# Patient Record
(demographics unavailable — no encounter records)

---

## 2025-03-05 NOTE — HISTORY OF PRESENT ILLNESS
[de-identified] : 40 year old M patient presents today with left shoulder/back pain.  Of note patient has medical history of cerebral palsy. Patient does not recall any injury or trauma to this arm.  Most of his pain is towards the back around his scapula mostly. He endorses pain with certain movements of his shoulder.  He has had prior injections to this area completed by outside providers which helped relieve his pain.  He denies any numbness or tingling. The patient's past medical history, past surgical history, medications, allergies, and social history were reviewed by me today with the patient and documented accordingly. In addition, the patient's family history, which is noncontributory to this visit, was also reviewed.

## 2025-03-05 NOTE — PHYSICAL EXAM
[de-identified] : General Exam   Well developed, well nourished No apparent distress Oriented to person, place, and time Mood: Normal Affect: Normal Balance and coordination: Normal Gait: Normal  Left shoulder exam   Inspection: No swelling, ecchymosis or gross deformity. Skin: No masses, No lesions Tenderness: No bicipital tenderness, no tenderness to the greater tuberosity/RTC insertion, no anterior shoulder/lesser tuberosity tenderness. No tenderness SC joint, clavicle, AC joint.  There is tenderness along the medial scapular border ROM: 160/60/T6 Impingement tests: Positive Crocker AC Joint: no pain with cross arm testing Biceps: Negative speed Strength: 5/5 abduction, external rotation, and internal rotation Neuro: AIN, PIN, Ulnar nerve motor intact Sensation: Intact to light touch in radial, median, ulnar, and axillary nerve distributions Vasc: 2+ radial pulse [de-identified] : The following radiographs were ordered and read by me during this patients visit. I reviewed each radiograph in detail with the patient and discussed the findings as highlighted below.  3 views left shoulder were obtained today glenohumeral joint well-maintained normal alignment no fracture.

## 2025-03-05 NOTE — DISCUSSION/SUMMARY
[de-identified] : Longstanding left scapulothoracic pain he said pain off and on for almost 15 years he has had at least 3 corticosteroid injections into this area they have helped but have been self-limiting.  He has pain with overhead activity and reaching behind his back he is significant tenderness along his medial scapular border.  We will obtain an MRI to assess for anatomic causes of his scapulothoracic pain and bursitis.  Consider potential EMG if MRI is unremarkable all questions answered

## 2025-06-13 NOTE — PHYSICAL EXAM
[de-identified] : Right shoulder exam   Inspection: No swelling, ecchymosis or gross deformity. Skin: No masses, No lesions Tenderness: No bicipital tenderness, no tenderness to the greater tuberosity/RTC insertion, no anterior shoulder/lesser tuberosity tenderness. No tenderness SC joint, clavicle, AC joint. ROM: 160/60/T6 Impingement tests: Positive Crocker AC Joint: no pain with cross arm testing Biceps: Negative speed Strength: 5/5 abduction, external rotation, and 4 out of 5 internal rotation with positive belly press and lift off Neuro: AIN, PIN, Ulnar nerve motor intact Sensation: Intact to light touch in radial, median, ulnar, and axillary nerve distributions Vasc: 2+ radial pulse  [de-identified] : The following radiographs were ordered and read by me during this patients visit. I reviewed each radiograph in detail with the patient and discussed the findings as highlighted below.  3 views right shoulder were obtained today glenohumeral joint well-maintained normal alignment no fracture.

## 2025-06-13 NOTE — HISTORY OF PRESENT ILLNESS
[de-identified] : 40 year old M patient presents today with new issue of right shoulder pain.  Of note patient has medical history of cerebral palsy. While playing cricket over the weekend, when he attempted to throw the ball with his right arm he felt a pop and an immediate sharp pain. Since then he has been icing his shoulder and taking tylenol for the pain. He sees some improvement with his pain. But he states he has pain and difficulty with overhead motion or reaching behind his back.  He denies any numbness or tingling. The patient's past medical history, past surgical history, medications, allergies, and social history were reviewed by me today with the patient and documented accordingly. In addition, the patient's family history, which is noncontributory to this visit, was also reviewed.

## 2025-06-13 NOTE — DISCUSSION/SUMMARY
[de-identified] : 40-year-old male acute right shoulder injury while playing cricket he is significant weakness with internal rotation there is concern for traumatic rupture of the subscapularis he will start physical therapy at this time an MRI is medically necessary he will follow-up after the MRI.  All questions were answered.  For pain control at this time given prescription for diclofenac side effects discussed

## 2025-07-16 NOTE — HISTORY OF PRESENT ILLNESS
[de-identified] : 40 year old M patient presents today with new issue of right shoulder pain.  Of note patient has medical history of cerebral palsy. While playing cricket over the weekend, when he attempted to throw the ball with his right arm he felt a pop and an immediate sharp pain. Since then he has been icing his shoulder and taking tylenol for the pain. He sees some improvement with his pain. But he states he has pain and difficulty with overhead motion or reaching behind his back.  He denies any numbness or tingling. The patient's past medical history, past surgical history, medications, allergies, and social history were reviewed by me today with the patient and documented accordingly. In addition, the patient's family history, which is noncontributory to this visit, was also reviewed.

## 2025-07-16 NOTE — DISCUSSION/SUMMARY
[de-identified] : Right shoulder rotator cuff tendinitis.  Injection: Right shoulder (Subacromial). Indication: [Impingement   A discussion was had with the patient regarding this procedure and all questions were answered. All risks, benefits and alternatives were discussed. These included but were not limited to bleeding, infection, and allergic reaction. Alcohol was used to clean the skin, and betadine was used to sterilize and prep the area in the posterior aspect of the right shoulder. Ethyl chloride spray was then used as a topical anesthetic. A 21-gauge needle was used to inject 4cc of 1% lidocaine and 1cc of 40mg/ml methylprednisolone into the right subacromial space. A sterile bandage was then applied. The patient tolerated the procedure well and there were no complications.  Use ice, Tylenol, anti-inflammatory medication as needed. FU as needed.  He does have recurrent fasciculations from his underlying cerebral palsy.  Consider physiatry evaluation regarding possible medical management.  All questions were answered

## 2025-07-16 NOTE — PHYSICAL EXAM
[de-identified] : Right shoulder exam   Inspection: No swelling, ecchymosis or gross deformity. Skin: No masses, No lesions Tenderness: No bicipital tenderness, no tenderness to the greater tuberosity/RTC insertion, no anterior shoulder/lesser tuberosity tenderness. No tenderness SC joint, clavicle, AC joint. ROM: 160/60/T6 Impingement tests: Positive Crocker AC Joint: no pain with cross arm testing Biceps: Negative speed Strength: 5/5 abduction, external rotation, and 4 out of 5 internal rotation with positive belly press and lift off Neuro: AIN, PIN, Ulnar nerve motor intact Sensation: Intact to light touch in radial, median, ulnar, and axillary nerve distributions Vasc: 2+ radial pulse  [de-identified] : MRI reviewed.  Motion limited.  Nondisplaced labral tear.  Tendinosis of the subscapularis without full-thickness tear